# Patient Record
Sex: MALE | Race: WHITE | NOT HISPANIC OR LATINO | URBAN - METROPOLITAN AREA
[De-identification: names, ages, dates, MRNs, and addresses within clinical notes are randomized per-mention and may not be internally consistent; named-entity substitution may affect disease eponyms.]

---
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
## 2021-01-07 ENCOUNTER — NON-APPOINTMENT (OUTPATIENT)
Age: 21
End: 2021-01-07

## 2021-01-07 ENCOUNTER — APPOINTMENT (OUTPATIENT)
Dept: INTERNAL MEDICINE | Facility: CLINIC | Age: 21
End: 2021-01-07
Payer: COMMERCIAL

## 2021-01-07 DIAGNOSIS — Z23 ENCOUNTER FOR IMMUNIZATION: ICD-10-CM

## 2021-01-07 PROCEDURE — 90471 IMMUNIZATION ADMIN: CPT

## 2021-01-07 PROCEDURE — 99072 ADDL SUPL MATRL&STAF TM PHE: CPT

## 2021-01-07 PROCEDURE — 90621 MENB-FHBP VACC 2/3 DOSE IM: CPT

## 2021-01-07 PROCEDURE — 90472 IMMUNIZATION ADMIN EACH ADD: CPT

## 2021-01-07 PROCEDURE — 90651 9VHPV VACCINE 2/3 DOSE IM: CPT

## 2021-04-12 DIAGNOSIS — Z11.59 ENCOUNTER FOR SCREENING FOR OTHER VIRAL DISEASES: ICD-10-CM

## 2021-04-18 NOTE — ED PEDIATRIC TRIAGE NOTE - MEANS OF ARRIVAL
10-Apr-2021 23:32 07-Apr-2021 03:55 15-Apr-2021 05:02 14-Apr-2021 07:09 16-Apr-2021 06:50 06-Apr-2021 11:55 15-Apr-2021 01:15 18-Apr-2021 03:10 17-Apr-2021 06:00 16-Apr-2021 19:44 ambulatory

## 2021-05-10 ENCOUNTER — APPOINTMENT (OUTPATIENT)
Dept: INTERNAL MEDICINE | Facility: CLINIC | Age: 21
End: 2021-05-10

## 2021-12-22 NOTE — ED PEDIATRIC NURSE NOTE - OBJECTIVE STATEMENT
Cardiology [address] [facility] [place] Darin [place]. [place] [address]  Phone: [phone]  Fax: [phone]    12/22/2021        Eyal Villanueva  [address] [facility]            Dear Delmy Sparks:    This is your [facility] schedule. You can kris your calendar with these dates. Remember that your device is wireless and should automatically do these checks while you are sleeping. If for any reason I do not get your transmission then I will call you and ask that you send a manual transmission. If you have any questions or concerns, please call and ask for James Dunn at [phone]. Thank you. Patient [place] 6[place] and has lacerations to his right knee and abrasions to left knee. Denies head trauma, no LOC.

## 2022-04-11 PROBLEM — Z11.59 SCREENING FOR VIRAL DISEASE: Status: ACTIVE | Noted: 2021-04-12
